# Patient Record
Sex: FEMALE | Race: WHITE | NOT HISPANIC OR LATINO | Employment: OTHER | URBAN - METROPOLITAN AREA
[De-identification: names, ages, dates, MRNs, and addresses within clinical notes are randomized per-mention and may not be internally consistent; named-entity substitution may affect disease eponyms.]

---

## 2019-07-25 ENCOUNTER — HOSPITAL ENCOUNTER (OUTPATIENT)
Dept: MEDSURG UNIT | Facility: HOSPITAL | Age: 59
End: 2019-07-26
Attending: COLON & RECTAL SURGERY | Admitting: COLON & RECTAL SURGERY

## 2019-07-25 LAB
ABS NEUT (OLG): 9.36 X10(3)/MCL (ref 2.1–9.2)
ALBUMIN SERPL-MCNC: 4.2 GM/DL (ref 3.4–5)
ALBUMIN/GLOB SERPL: 1 {RATIO}
ALP SERPL-CCNC: 77 UNIT/L (ref 38–126)
ALT SERPL-CCNC: 34 UNIT/L (ref 12–78)
APPEARANCE, UA: CLEAR
AST SERPL-CCNC: 27 UNIT/L (ref 15–37)
BACTERIA SPEC CULT: NORMAL /HPF
BASOPHILS # BLD AUTO: 0 X10(3)/MCL (ref 0–0.2)
BASOPHILS NFR BLD AUTO: 0 %
BILIRUB SERPL-MCNC: 0.6 MG/DL (ref 0.2–1)
BILIRUB UR QL STRIP: NEGATIVE
BILIRUBIN DIRECT+TOT PNL SERPL-MCNC: 0.2 MG/DL (ref 0–0.2)
BILIRUBIN DIRECT+TOT PNL SERPL-MCNC: 0.4 MG/DL (ref 0–0.8)
BUN SERPL-MCNC: 14 MG/DL (ref 7–18)
CALCIUM SERPL-MCNC: 9.4 MG/DL (ref 8.5–10.1)
CHLORIDE SERPL-SCNC: 107 MMOL/L (ref 98–107)
CO2 SERPL-SCNC: 22 MMOL/L (ref 21–32)
COLOR UR: YELLOW
CREAT SERPL-MCNC: 0.93 MG/DL (ref 0.55–1.02)
EOSINOPHIL # BLD AUTO: 0.1 X10(3)/MCL (ref 0–0.9)
EOSINOPHIL NFR BLD AUTO: 1 %
ERYTHROCYTE [DISTWIDTH] IN BLOOD BY AUTOMATED COUNT: 13.2 % (ref 11.5–17)
GLOBULIN SER-MCNC: 4 GM/DL (ref 2.4–3.5)
GLUCOSE (UA): NEGATIVE
GLUCOSE SERPL-MCNC: 84 MG/DL (ref 74–106)
HCT VFR BLD AUTO: 49 % (ref 37–47)
HGB BLD-MCNC: 15.5 GM/DL (ref 12–16)
HGB UR QL STRIP: NEGATIVE
IMM GRANULOCYTES # BLD AUTO: 0 10*3/UL
IMM GRANULOCYTES NFR BLD AUTO: 0 %
KETONES UR QL STRIP: NEGATIVE
LEUKOCYTE ESTERASE UR QL STRIP: NEGATIVE
LYMPHOCYTES # BLD AUTO: 1.3 X10(3)/MCL (ref 0.6–4.6)
LYMPHOCYTES NFR BLD AUTO: 11 %
MCH RBC QN AUTO: 29.2 PG (ref 27–31)
MCHC RBC AUTO-ENTMCNC: 31.6 GM/DL (ref 33–36)
MCV RBC AUTO: 92.3 FL (ref 80–94)
MONOCYTES # BLD AUTO: 0.8 X10(3)/MCL (ref 0.1–1.3)
MONOCYTES NFR BLD AUTO: 6 %
NEUTROPHILS # BLD AUTO: 9.36 X10(3)/MCL (ref 2.1–9.2)
NEUTROPHILS NFR BLD AUTO: 81 %
NITRITE UR QL STRIP: NEGATIVE
NRBC BLD AUTO-RTO: 0 % (ref 0–0.2)
PH UR STRIP: 5.5 [PH] (ref 5–9)
PLATELET # BLD AUTO: 177 X10(3)/MCL (ref 130–400)
PMV BLD AUTO: 13.5 FL (ref 9.4–12.4)
POTASSIUM SERPL-SCNC: 3.7 MMOL/L (ref 3.5–5.1)
PROT SERPL-MCNC: 8.2 GM/DL (ref 6.4–8.2)
PROT UR QL STRIP: NEGATIVE
RBC # BLD AUTO: 5.31 X10(6)/MCL (ref 4.2–5.4)
RBC #/AREA URNS HPF: NORMAL /[HPF]
SODIUM SERPL-SCNC: 137 MMOL/L (ref 136–145)
SP GR UR STRIP: 1.02 (ref 1–1.03)
SQUAMOUS EPITHELIAL, UA: NORMAL
UROBILINOGEN UR STRIP-ACNC: 0.2
WBC # SPEC AUTO: 11.6 X10(3)/MCL (ref 4.5–11.5)
WBC #/AREA URNS HPF: NORMAL /[HPF]

## 2021-03-25 PROBLEM — E04.1 RIGHT THYROID NODULE: Status: ACTIVE | Noted: 2019-10-23

## 2021-03-25 PROBLEM — Z00.00 WELLNESS EXAMINATION: Status: ACTIVE | Noted: 2021-03-25

## 2021-04-22 PROBLEM — Z12.11 SCREENING FOR MALIGNANT NEOPLASM OF COLON: Status: ACTIVE | Noted: 2021-04-22

## 2021-04-26 PROBLEM — R79.89 ABNORMAL LIVER FUNCTION TEST: Status: ACTIVE | Noted: 2021-04-26

## 2021-04-26 PROBLEM — G89.29 CHRONIC NECK PAIN: Chronic | Status: ACTIVE | Noted: 2021-04-26

## 2021-04-26 PROBLEM — M54.2 CHRONIC NECK PAIN: Chronic | Status: ACTIVE | Noted: 2021-04-26

## 2021-05-12 ENCOUNTER — PATIENT MESSAGE (OUTPATIENT)
Dept: RESEARCH | Facility: HOSPITAL | Age: 61
End: 2021-05-12

## 2021-06-28 PROBLEM — Z00.00 WELLNESS EXAMINATION: Status: RESOLVED | Noted: 2021-03-25 | Resolved: 2021-06-28

## 2021-08-03 PROBLEM — R79.89 ELEVATED LFTS: Status: ACTIVE | Noted: 2021-08-03

## 2022-05-01 NOTE — ED PROVIDER NOTES
Patient:   Dea Martin            MRN: [id number]            FIN: [id number]               Age:   58 years     Sex:  Female     :  1960   Associated Diagnoses:   Acute appendicitis   Author:   Filipe Osborne      Basic Information   Time seen: Date & time 2019 14:37:00.   History source: Patient.   Arrival mode: Private vehicle.   History limitation: None.   Additional information: Patient's physician(s): 1620: Assumed care [facility].      History of Present Illness   The patient presents with abdominal pain.  The onset was 1  days ago.  The course/duration of symptoms is constant.  The character of symptoms is sharp.  The degree at onset was moderate.  The Location of pain at onset was right, lower and abdominal.  The degree at present is moderate.  The Location of pain at present is right, lower and abdominal.  Radiating pain: none. There are exacerbating factors including changing position and movement but not urination.  The relieving factor is none.  Therapy today: none.  Associated symptoms: nausea, denies vomiting, denies fever and denies chills.  58-year-old  female presents chief complaint of right lower quadrant abdominal pain.  States last night pain with generalized abdomen but today has moved to right lower quadrant.  Denies fever chills reports positive nausea and loss of appetite.  Reports past medical history significant for renal stones.  Denies any urinary symptoms.  Triage [place].        Review of Systems   Constitutional symptoms:  No fever, no chills.    Respiratory symptoms:  No shortness of breath, no cough.    Cardiovascular symptoms:  No chest pain, no palpitations.    Gastrointestinal symptoms:  Abdominal pain, nausea, no vomiting, no diarrhea.    Musculoskeletal symptoms:  No back pain,    Neurologic symptoms:  No altered level of consciousness, no weakness.              Additional review of systems information: All other  systems reviewed and otherwise negative.      Health Status   Allergies:    No active allergies have been recorded.,    No qualifying data available  .   Medications: Per nurse's notes.   Immunizations: Per nurse's notes.   Menstrual history: Per nurse's notes.      Past Medical/ Family/ Social History   Medical history:    No active or resolved past medical history items have been selected or recorded., Reviewed as documented in chart.   Surgical history:    No active procedure history items have been selected or recorded., Reviewed as documented in chart.   Family history:    No family history items have been selected or recorded., Reviewed as documented in chart.   Social history:    Social & Psychosocial Habits    No Data Available  , Reviewed as documented in chart.   Problem list:    No qualifying data available  , per nurse's notes.      Physical Examination               Vital Signs      No qualifying data available.   General:  Alert, no acute distress, well hydrated, Skin: Normal for ethnicity.    Skin:  Warm, dry, pink, intact.    Head:  Normocephalic.   Neck:  Supple, no tenderness, full range of motion.    Eye:  Pupils are equal, round and reactive to light, extraocular movements are intact, normal conjunctiva.    Cardiovascular:  Regular rate and rhythm, No murmur, Normal peripheral perfusion.    Respiratory:  Lungs are clear to auscultation, breath sounds are equal, Respirations: not tachypneic, not labored, not shallow, Retractions: None.    Chest wall:  No tenderness.   Back:  Normal range of motion, Normal alignment, No costovertebral angle tenderness,    Musculoskeletal:  Normal ROM, normal strength, no swelling, no deformity.    Gastrointestinal:  Soft, Non distended, Tenderness: Moderate, right lower quadrant, Guarding: Negative, Rebound: Negative, Bowel sounds: Normal, Organomegaly: Negative, Trauma: Negative, Mass: Negative, Scars: Negative, Signs: None.    Neurological:  Alert and oriented to  person, place, time, and situation, No focal neurological deficit observed, normal sensory observed, normal motor observed, normal speech observed, normal coordination observed, Gait: Normal.    Psychiatric:  Cooperative, appropriate mood & affect, normal judgment.       Medical Decision Making   Documents reviewed:  Emergency department nurses' notes.   Orders  Launch Orders   Laboratory:  UA Total a reflex to culture (Order Processing): Stat collect, Urine, 7/25/2019 14:39 CDT, Nurse collect, Print Label By Order Location  CMP (Order Processing): Stat collect, 7/25/2019 14:39 CDT, Blood, Lab Collect, Print Label By Order Location, 7/25/2019 14:39 CDT  CBC w/ Auto Diff (Order Processing): Now collect, 7/25/2019 14:39 CDT, Blood, Lab Collect, Print Label By Order Location, 7/25/2019 14:39 CDT, Launch Orders   Radiology:  CT Abdomen and Pelvis W Contrast (Order): Stat, 7/25/2019 16:28 CDT, Abdominal Pain, None, Stretcher, Rad Type, Schedule this test, Launch Orders   Pharmacy:  Zosyn 3.375 gm (for IVPB) (Order): 3.375 gm, form: Infusion, IV Piggyback, Once, first dose 7/25/2019 17:35 CDT, stop date 7/25/2019 17:35 CDT, STAT  Zofran 2 mg/mL injectable solution (Order): 4 mg, form: Injection, IV, Once, first dose 7/25/2019 17:35 CDT, stop date 7/25/2019 17:35 CDT, STAT  Dilaudid (Order): 1 mg, form: Injection, IV, Once, first dose 7/25/2019 17:35 CDT, stop date 7/25/2019 17:35 CDT, STAT.    Results review:  Lab results : Lab View   7/25/2019 14:55 CDT      Sodium Lvl                137 mmol/L                             Potassium Lvl             3.7 mmol/L                             Chloride                  107 mmol/L                             CO2                       22.0 mmol/L                             Calcium Lvl               9.4 mg/dL                             Glucose Lvl               84 mg/dL                             BUN                       14.0 mg/dL                             Creatinine                 0.93 mg/dL                             eGFR-AA                   >60 mL/min/1.73 m2  NA                             eGFR-MU                  >60 mL/min/1.73 m2  NA                             Bili Total                0.6 mg/dL                             Bili Direct               0.20 mg/dL                             Bili Indirect             0.40 mg/dL                             AST                       27 unit/L                             ALT                       34 unit/L                             Alk Phos                  77 unit/L                             Total Protein             8.2 gm/dL                             Albumin Lvl               4.20 gm/dL                             Globulin                  4.00 gm/dL  HI                             A/G Ratio                 1.0  NA                             WBC                       11.6 x10(3)/mcL  HI                             RBC                       5.31 x10(6)/mcL                             Hgb                       15.5 gm/dL                             Hct                       49.0 %  HI                             Platelet                  177 x10(3)/mcL                             MCV                       92.3 fL                             MCH                       29.2 pg                             MCHC                      31.6 gm/dL  LOW                             RDW                       13.2 %                             MPV                       13.5 fL  HI                             Abs Neut                  9.36 x10(3)/mcL  HI                             Neutro Auto               81 %  NA                             Lymph Auto                11 %  NA                             Mono Auto                 6 %  NA                             Eos Auto                  1 %  NA                             Abs Eos                   0.1 x10(3)/mcL                             Basophil Auto             0 %  NA                              Abs Neutro                9.36 x10(3)/mcL  HI                             Abs Lymph                 1.3 x10(3)/mcL                             Abs Mono                  0.8 x10(3)/mcL                             Abs Baso                  0.0 x10(3)/mcL                             NRBC%                     0.0 %                             IG%                       0  NA                             IG#                       0  NA                             UA Appear                 CLEAR                             UA Color                  YELLOW                             UA Spec Grav              1.018                             UA Bili                   Negative                             UA pH                     5.5                             UA Urobilinogen           0.2                             UA Blood                  Negative                             UA Glucose                Negative                             UA Ketones                Negative                             UA Protein                Negative                             UA Nitrite                Negative                             UA Leuk Est               Negative                             UA WBC                    NONE SEEN                             UA RBC                    NONE SEEN                             UA Bacteria               NONE SEEN /HPF                             UA Squam Epithelial       NONE SEEN  ,    No qualifying data available.    Radiology results:  Rad Results (ST)  < 12 hrs   Accession: [id number]  Order: CT Abdomen and Pelvis W Contrast  Report Dt/Tm: 07/25/2019 17:36  Report:      History.  Right lower quadrant pain.     Reference.  No priors     Technique.  Helical acquisition through the abdomen and pelvis with IV contrast.  Three plane reconstructions were provided for review.  mGycm.  Automatic exposure control, adjustment of mA/kV or iterative  reconstruction  technique was used to reduce radiation.     Findings.  The limited imaged lung bases are clear.     There are innumerable small hepatic hypodensities which statistically  represent cysts. No significant abnormality of the gallbladder,  spleen, pancreas or adrenals. No hydronephrosis. Symmetric  nephrograms. There are some pelvic cysts primarily on the left.      No bowel obstruction. Appendix is dilated and fluid-filled with  periappendiceal inflammation. No gross perforation or abscess.     Urinary bladder unremarkable. No free fluid. Abdominal aorta normal in  caliber. No enlarged retroperitoneal lymph nodes.      No acute osseous findings.     Impression.  Acute appendicitis. No gross perforation or drainable abscess.     Critical Result: Appendicitis     Results were reported to Filipe Alexander at 1734 on 7/25/2019      .      Impression and Plan   Diagnosis   Acute appendicitis (AIF07-IE K35.80)   Plan   Condition: Stable.    Disposition: Admit time  7/25/2019 17:46:00, Admit to Surgery.    Orders: Launch Orders   Admit/Transfer/Discharge:  Admit as Inpatient (Order): 7/25/2019 17:46 CDT, Medical Unit Kyle KIM, Edwin MOLINA, Yes.    Notes: Admitted in collaboration with .

## 2022-05-01 NOTE — OP NOTE
DATE OF SURGERY:    07/25/2019    SURGEON:  Edwin Wright MD  ASSISTANT:  Kimo Leal MD, Lety Holley MD    PREOPERATIVE DIAGNOSIS:  Acute appendicitis.    POSTOPERATIVE DIAGNOSIS:  Acute appendicitis.    PROCEDURE:  Laparoscopic appendectomy.    ANESTHESIA:  General.    ESTIMATED BLOOD LOSS:  Less than 50 cc.    SPECIMENS:  Appendix.    COMPLICATIONS:  None.    PROCEDURE IN DETAIL:  After informed consent was obtained, patient was brought to the operating room and placed in the supine position.  Next, general endotracheal anesthesia was administered by a member of the anesthesia team.  The abdomen was prepped and draped in sterile surgical fashion.  Marcaine 0.25% with epinephrine was used for local anesthesia.  A curvilinear supraumbilical incision was made with a 15 blade.  Incision was deepened with electrocautery.  The fascia was divided vertically in the midline and the peritoneum was incised sharply.  A 12 mm balloon trocar was introduced, and pneumoperitoneum was achieved.  A 10 mm angled laparoscope was then used to place 2 additional 5 mm working trocars.  The patient was placed in Trendelenburg left side-down position.  Attention was turned to the right lower quadrant, where a thickened, inflamed appendix was noted.  The appendix was mobilized laterally by incising the peritoneum and then retracting it towards the anterior abdominal wall.  A small window was made at the base of the appendix along the mesenteric border.  The appendix was then divided at this level using a 45 mm Ironwood stapler with a blue cartridge.  The mesoappendix was then divided with a Harmonic scalpel.  The appendix was then placed within an Endo Catch retrieval bag, which was left in the abdominal cavity until the end of the case.  The right gutter and pelvis were thoroughly irrigated and suctioned until the effluent was clear.  The staple line was visualized and intact.  The patient was returned to the neutral  position.  Bowels were covered with omentum.  Pneumoperitoneum was released, and working trocars were removed.  Finally, the laparoscope and umbilical trocar were removed.  The Endo Catch retrieval bag containing the appendix was removed and sent for permanent pathology.  The umbilical fascial defect was repaired with a figure-of-eight 0 Vicryl suture.  All wounds were irrigated and the skin edges reapproximated with Monocryl suture in a subcuticular fashion.  The incisions were cleaned and sterile bandages applied.  The patient tolerated the procedure well.  There were no complications.  She was awakened and extubated in the operating room, then subsequently transferred to recovery in satisfactory condition.        ______________________________  MD ARNIE Reed/JANNETTE  DD:  07/25/2019  Time:  09:22PM  DT:  07/25/2019  Time:  09:35PM  Job #:  [id number]

## 2022-10-13 ENCOUNTER — OFFICE VISIT (OUTPATIENT)
Dept: HEPATOLOGY | Facility: CLINIC | Age: 62
End: 2022-10-13
Payer: COMMERCIAL

## 2022-10-13 ENCOUNTER — LAB VISIT (OUTPATIENT)
Dept: LAB | Facility: HOSPITAL | Age: 62
End: 2022-10-13
Attending: INTERNAL MEDICINE
Payer: COMMERCIAL

## 2022-10-13 VITALS
BODY MASS INDEX: 25.29 KG/M2 | WEIGHT: 152 LBS | HEART RATE: 69 BPM | OXYGEN SATURATION: 99 % | DIASTOLIC BLOOD PRESSURE: 70 MMHG | TEMPERATURE: 98 F | SYSTOLIC BLOOD PRESSURE: 118 MMHG

## 2022-10-13 DIAGNOSIS — K75.4 AUTOIMMUNE HEPATITIS: ICD-10-CM

## 2022-10-13 DIAGNOSIS — K75.4 AUTOIMMUNE HEPATITIS: Primary | ICD-10-CM

## 2022-10-13 LAB
AFP SERPL-MCNC: 5.9 NG/ML (ref 0–8.4)
ALBUMIN SERPL BCP-MCNC: 3.8 G/DL (ref 3.5–5.2)
ALP SERPL-CCNC: 96 U/L (ref 55–135)
ALT SERPL W/O P-5'-P-CCNC: 16 U/L (ref 10–44)
ANION GAP SERPL CALC-SCNC: 7 MMOL/L (ref 8–16)
AST SERPL-CCNC: 27 U/L (ref 10–40)
BILIRUB SERPL-MCNC: 0.5 MG/DL (ref 0.1–1)
BUN SERPL-MCNC: 15 MG/DL (ref 8–23)
CALCIUM SERPL-MCNC: 9.7 MG/DL (ref 8.7–10.5)
CHLORIDE SERPL-SCNC: 107 MMOL/L (ref 95–110)
CO2 SERPL-SCNC: 27 MMOL/L (ref 23–29)
CREAT SERPL-MCNC: 0.8 MG/DL (ref 0.5–1.4)
ERYTHROCYTE [DISTWIDTH] IN BLOOD BY AUTOMATED COUNT: 12.3 % (ref 11.5–14.5)
EST. GFR  (NO RACE VARIABLE): >60 ML/MIN/1.73 M^2
GLUCOSE SERPL-MCNC: 90 MG/DL (ref 70–110)
HCT VFR BLD AUTO: 43.8 % (ref 37–48.5)
HGB BLD-MCNC: 14.8 G/DL (ref 12–16)
IGG SERPL-MCNC: 1409 MG/DL (ref 650–1600)
MCH RBC QN AUTO: 31.6 PG (ref 27–31)
MCHC RBC AUTO-ENTMCNC: 33.8 G/DL (ref 32–36)
MCV RBC AUTO: 93 FL (ref 82–98)
PLATELET # BLD AUTO: 173 K/UL (ref 150–450)
PMV BLD AUTO: 11.7 FL (ref 9.2–12.9)
POTASSIUM SERPL-SCNC: 4.2 MMOL/L (ref 3.5–5.1)
PROT SERPL-MCNC: 7.3 G/DL (ref 6–8.4)
RBC # BLD AUTO: 4.69 M/UL (ref 4–5.4)
SODIUM SERPL-SCNC: 141 MMOL/L (ref 136–145)
WBC # BLD AUTO: 4.47 K/UL (ref 3.9–12.7)

## 2022-10-13 PROCEDURE — 1159F MED LIST DOCD IN RCRD: CPT | Mod: CPTII,S$GLB,, | Performed by: INTERNAL MEDICINE

## 2022-10-13 PROCEDURE — 99999 PR PBB SHADOW E&M-EST. PATIENT-LVL III: CPT | Mod: PBBFAC,,, | Performed by: INTERNAL MEDICINE

## 2022-10-13 PROCEDURE — 99205 PR OFFICE/OUTPT VISIT, NEW, LEVL V, 60-74 MIN: ICD-10-PCS | Mod: S$GLB,,, | Performed by: INTERNAL MEDICINE

## 2022-10-13 PROCEDURE — 85027 COMPLETE CBC AUTOMATED: CPT | Performed by: INTERNAL MEDICINE

## 2022-10-13 PROCEDURE — 86256 FLUORESCENT ANTIBODY TITER: CPT | Performed by: INTERNAL MEDICINE

## 2022-10-13 PROCEDURE — 99999 PR PBB SHADOW E&M-EST. PATIENT-LVL III: ICD-10-PCS | Mod: PBBFAC,,, | Performed by: INTERNAL MEDICINE

## 2022-10-13 PROCEDURE — 3078F PR MOST RECENT DIASTOLIC BLOOD PRESSURE < 80 MM HG: ICD-10-PCS | Mod: CPTII,S$GLB,, | Performed by: INTERNAL MEDICINE

## 2022-10-13 PROCEDURE — 36415 COLL VENOUS BLD VENIPUNCTURE: CPT | Performed by: INTERNAL MEDICINE

## 2022-10-13 PROCEDURE — 3074F PR MOST RECENT SYSTOLIC BLOOD PRESSURE < 130 MM HG: ICD-10-PCS | Mod: CPTII,S$GLB,, | Performed by: INTERNAL MEDICINE

## 2022-10-13 PROCEDURE — 86039 ANTINUCLEAR ANTIBODIES (ANA): CPT | Performed by: INTERNAL MEDICINE

## 2022-10-13 PROCEDURE — 3074F SYST BP LT 130 MM HG: CPT | Mod: CPTII,S$GLB,, | Performed by: INTERNAL MEDICINE

## 2022-10-13 PROCEDURE — 80053 COMPREHEN METABOLIC PANEL: CPT | Performed by: INTERNAL MEDICINE

## 2022-10-13 PROCEDURE — 3078F DIAST BP <80 MM HG: CPT | Mod: CPTII,S$GLB,, | Performed by: INTERNAL MEDICINE

## 2022-10-13 PROCEDURE — 99205 OFFICE O/P NEW HI 60 MIN: CPT | Mod: S$GLB,,, | Performed by: INTERNAL MEDICINE

## 2022-10-13 PROCEDURE — 82105 ALPHA-FETOPROTEIN SERUM: CPT | Performed by: INTERNAL MEDICINE

## 2022-10-13 PROCEDURE — 86038 ANTINUCLEAR ANTIBODIES: CPT | Performed by: INTERNAL MEDICINE

## 2022-10-13 PROCEDURE — 86235 NUCLEAR ANTIGEN ANTIBODY: CPT | Mod: 59 | Performed by: INTERNAL MEDICINE

## 2022-10-13 PROCEDURE — 1159F PR MEDICATION LIST DOCUMENTED IN MEDICAL RECORD: ICD-10-PCS | Mod: CPTII,S$GLB,, | Performed by: INTERNAL MEDICINE

## 2022-10-13 PROCEDURE — 82784 ASSAY IGA/IGD/IGG/IGM EACH: CPT | Performed by: INTERNAL MEDICINE

## 2022-10-13 RX ORDER — CEPHALEXIN 500 MG/1
500 CAPSULE ORAL DAILY PRN
COMMUNITY
Start: 2022-09-14 | End: 2023-02-06 | Stop reason: SDUPTHER

## 2022-10-13 NOTE — PROGRESS NOTES
Subjective:       Patient ID: Dea Martin is a 61 y.o. female.    Chief Complaint: Elevated Hepatic Enzymes    HPI  I saw this 61 y.o. lady who came to the liver clinic alone.  She was diagnosed with Autoimmune hepatitis in the summer of 2021 after LFT elevation noted in March 2021.    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  Differential includes nonalcoholic   steatohepatitis, drug toxicity, and hypersensitivity reaction.  Correlate   clinically. Negative for hepatocellular dysplasia.                Note:  There is moderate chronic portal inflammation containing eosinophils   present along with focal interface activity.  There is patchy mild lobular   inflammation present.  Approximately 40% of lobular hepatocytes exhibit mixed   micro and macrovesicular steatosis.  There is also patchy ballooning   degeneration of hepatocytes present.  No Sweetie bodies are present.  No   hepatocellular dysplasia is present.  Trichrome stain reveals several portal   tracts with expansion by collagenous tissue with focal septi formation.  No   bridging fibrosis is present.  Reticulin stain, DPAS, PAS, and iron stains   are noncontributory or reveal normal expected findings.  Controls are   adequate.  Differential diagnosis includes nonalcoholic steatohepatitis,   alcohol hepatitis, drug toxicity, and hypersensitivity reaction.  Clinical   correlation is recommended..  Using the NAFLD activity score, this biopsy   would received 1+ for lobular inflammation, 2 + for steatosis, and 1+ for   ballooning degeneration for total score of 4/8.  The staging score would be1c   for mild portal fibrosis without pericellular fibrosis      - LFTs started ocming down with immunosuppression  - Peak AST/ALT= 397/631    - NSAIDs  - Some RUQ discomfort- intermittent    - no supplements in 2021  - on premarin cream Feb  2021    Currently on UDCA  AZA- 50 mg daily  Liver and kidney cysts on imaging    Body mass index is 25.29 kg/m².      PMH:  Appendectomy 2019  Bladder suspension  Kidney stone + kidney cysts  Thyroid nodule    SH:  Alcohol- nil for 25 years  Non smoker    RN - retired 2013      FH:  Nil autoimmune  DM  Heart disease      Review of Systems   Constitutional:  Negative for activity change, appetite change, chills, fatigue, fever and unexpected weight change.   HENT:  Negative for ear pain, hearing loss, nosebleeds, sore throat and trouble swallowing.    Eyes:  Negative for redness and visual disturbance.   Respiratory:  Negative for cough, chest tightness, shortness of breath and wheezing.    Cardiovascular:  Negative for chest pain and palpitations.   Gastrointestinal:  Negative for abdominal distention, abdominal pain, blood in stool, constipation, diarrhea, nausea and vomiting.   Genitourinary:  Negative for difficulty urinating, dysuria, frequency, hematuria and urgency.   Musculoskeletal:  Negative for arthralgias, back pain, gait problem, joint swelling and myalgias.   Skin:  Negative for rash.   Neurological:  Negative for tremors, seizures, speech difficulty, weakness and headaches.   Hematological:  Negative for adenopathy.   Psychiatric/Behavioral:  Negative for confusion, decreased concentration and sleep disturbance. The patient is not nervous/anxious.        Lab Results   Component Value Date    ALT 16 10/13/2022    AST 27 10/13/2022    ALKPHOS 96 10/13/2022    BILITOT 0.5 10/13/2022     Past Medical History:   Diagnosis Date    Thyroid nodule      Past Surgical History:   Procedure Laterality Date    APPENDECTOMY      BLADDER SUSPENSION      COLONOSCOPY N/A 4/22/2021    Procedure: COLONOSCOPY;  Surgeon: Bo Upton MD;  Location: [facility];  Service: Endoscopy;  Laterality: N/A;     Current Outpatient Medications   Medication Sig    azaTHIOprine (IMURAN) 50 mg Tab Take 1 tablet (50 mg total) by mouth  once daily.    ursodioL (ACTIGALL) 500 MG tablet Take 1 tablet (500 mg total) by mouth 3 (three) times daily.    azaTHIOprine (IMURAN) 50 mg Tab Take 1 tablet (50 mg total) by mouth once daily.    cephALEXin (KEFLEX) 500 MG capsule Take 500 mg by mouth daily as needed.    conjugated estrogens (PREMARIN) vaginal cream Place 0.5 g vaginally once daily.    folic acid/multivit-min/lutein (CENTRUM SILVER ORAL) Take 1 tablet by mouth once daily.    nitrofurantoin monohyd/m-cryst (MACROBID ORAL) Take by mouth.    prochlorperazine (COMPAZINE) 5 MG tablet Take 1 tablet (5 mg total) by mouth 3 (three) times daily as needed for Nausea (and headache).     No current facility-administered medications for this visit.       Objective:      Physical Exam  Constitutional:       General: She is not in acute distress.  HENT:      Head: Normocephalic.   Eyes:      Pupils: Pupils are equal, round, and reactive to light.   Neck:      Thyroid: No thyromegaly.      Vascular: No JVD.      Trachea: No tracheal deviation.   Cardiovascular:      Rate and Rhythm: Normal rate and regular rhythm.      Heart sounds: Normal heart sounds. No murmur heard.  Pulmonary:      Effort: Pulmonary effort is normal.      Breath sounds: Normal breath sounds. No stridor.   Abdominal:      Palpations: Abdomen is soft.   Lymphadenopathy:      Head:      Right side of head: No submental, submandibular, tonsillar, preauricular, posterior auricular or occipital adenopathy.      Left side of head: No submental, submandibular, tonsillar, preauricular, posterior auricular or occipital adenopathy.      Cervical: No cervical adenopathy.   Neurological:      Mental Status: She is alert. She is not disoriented.      Cranial Nerves: No cranial nerve deficit.      Sensory: No sensory deficit.         Assessment:       1. Autoimmune hepatitis        Plan:   It appears that she has responded well to AZA- never had corticosteroids.    1) Labs and AFP today  2) Liver US  3) Can  stop UDCA  4) Monthly labs    Video visit in 3 months

## 2022-10-14 LAB
ANA PATTERN 1: NORMAL
ANA SER QL IF: POSITIVE
ANA TITR SER IF: NORMAL {TITER}

## 2022-10-16 PROBLEM — K75.4 AUTOIMMUNE HEPATITIS: Status: ACTIVE | Noted: 2022-10-16

## 2022-10-17 LAB — SMOOTH MUSCLE AB TITR SER IF: NORMAL {TITER}

## 2022-10-18 LAB
ANTI SM ANTIBODY: 0.09 RATIO (ref 0–0.99)
ANTI SM/RNP ANTIBODY: 0.12 RATIO (ref 0–0.99)
ANTI-SM INTERPRETATION: NEGATIVE
ANTI-SM/RNP INTERPRETATION: NEGATIVE
ANTI-SSA ANTIBODY: 0.07 RATIO (ref 0–0.99)
ANTI-SSA INTERPRETATION: NEGATIVE
ANTI-SSB ANTIBODY: 0.05 RATIO (ref 0–0.99)
ANTI-SSB INTERPRETATION: NEGATIVE
DSDNA AB SER-ACNC: NORMAL [IU]/ML

## 2023-01-25 ENCOUNTER — OFFICE VISIT (OUTPATIENT)
Dept: HEPATOLOGY | Facility: CLINIC | Age: 63
End: 2023-01-25
Payer: COMMERCIAL

## 2023-01-25 DIAGNOSIS — K75.4 AUTOIMMUNE HEPATITIS: Primary | ICD-10-CM

## 2023-01-25 PROCEDURE — 99213 PR OFFICE/OUTPT VISIT, EST, LEVL III, 20-29 MIN: ICD-10-PCS | Mod: 95,,, | Performed by: INTERNAL MEDICINE

## 2023-01-25 PROCEDURE — 99213 OFFICE O/P EST LOW 20 MIN: CPT | Mod: 95,,, | Performed by: INTERNAL MEDICINE

## 2023-01-25 RX ORDER — AZATHIOPRINE 50 MG/1
50 TABLET ORAL DAILY
Qty: 30 TABLET | Refills: 11 | Status: SHIPPED | OUTPATIENT
Start: 2023-01-25 | End: 2024-01-25

## 2023-01-25 NOTE — PROGRESS NOTES
Subjective:       Patient ID: Dea Martin is a 62 y.o. female.    Chief Complaint: No chief complaint on file.  The patient location is: Home  The chief complaint leading to consultation is: Autoimmune hepatitis    Visit type: audiovisual    Face to Face time with patient: 20 minutes of total time spent on the encounter, which includes face to face time and non-face to face time preparing to see the patient (eg, review of tests), Obtaining and/or reviewing separately obtained history, Documenting clinical information in the electronic or other health record, Independently interpreting results (not separately reported) and communicating results to the patient/family/caregiver, or Care coordination (not separately reported).       Each patient to whom he or she provides medical services by telemedicine is:  (1) informed of the relationship between the physician and patient and the respective role of any other health care provider with respect to management of the patient; and (2) notified that he or she may decline to receive medical services by telemedicine and may withdraw from such care at any time.    Notes:       HPI  I saw this 62 y.o. lady for follow up.  She was diagnosed with Autoimmune hepatitis in the summer of 2021 after LFT elevation noted in March 2021.    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  Differential includes nonalcoholic   steatohepatitis, drug toxicity, and hypersensitivity reaction.  Correlate   clinically. Negative for hepatocellular dysplasia.                Note:  There is moderate chronic portal inflammation containing eosinophils   present along with focal interface activity.  There is patchy mild lobular   inflammation present.  Approximately 40% of lobular hepatocytes exhibit mixed   micro and macrovesicular steatosis.  There is also patchy ballooning    degeneration of hepatocytes present.  No Sweetie bodies are present.  No   hepatocellular dysplasia is present.  Trichrome stain reveals several portal   tracts with expansion by collagenous tissue with focal septi formation.  No   bridging fibrosis is present.  Reticulin stain, DPAS, PAS, and iron stains   are noncontributory or reveal normal expected findings.  Controls are   adequate.  Differential diagnosis includes nonalcoholic steatohepatitis,   alcohol hepatitis, drug toxicity, and hypersensitivity reaction.  Clinical   correlation is recommended..  Using the NAFLD activity score, this biopsy   would received 1+ for lobular inflammation, 2 + for steatosis, and 1+ for   ballooning degeneration for total score of 4/8.  The staging score would be1c   for mild portal fibrosis without pericellular fibrosis      - LFTs started ocming down with immunosuppression  - Peak AST/ALT= 397/631    - NSAIDs  - Some RUQ discomfort- intermittent    - no supplements in 2021  - on premarin cream Feb 2021    AZA- 50 mg daily- stopped UDCA  Liver and kidney cysts on imaging      PMH:  Appendectomy 2019  Bladder suspension  Kidney stone + kidney cysts  Thyroid nodule    SH:  Alcohol- nil for 25 years  Non smoker    RN - retired 2013      FH:  Nil autoimmune  DM  Heart disease      Review of Systems   Constitutional:  Negative for activity change, appetite change, chills, fatigue, fever and unexpected weight change.   HENT:  Negative for ear pain, hearing loss, nosebleeds, sore throat and trouble swallowing.    Eyes:  Negative for redness and visual disturbance.   Respiratory:  Negative for cough, chest tightness, shortness of breath and wheezing.    Cardiovascular:  Negative for chest pain and palpitations.   Gastrointestinal:  Negative for abdominal distention, abdominal pain, blood in stool, constipation, diarrhea, nausea and vomiting.   Genitourinary:  Negative for difficulty urinating, dysuria, frequency, hematuria and  urgency.   Musculoskeletal:  Negative for arthralgias, back pain, gait problem, joint swelling and myalgias.   Skin:  Negative for rash.   Neurological:  Negative for tremors, seizures, speech difficulty, weakness and headaches.   Hematological:  Negative for adenopathy.   Psychiatric/Behavioral:  Negative for confusion, decreased concentration and sleep disturbance. The patient is not nervous/anxious.        Lab Results   Component Value Date    ALT 21 01/13/2023    AST 31 01/13/2023    ALKPHOS 72 01/13/2023    BILITOT 0.6 01/13/2023     Past Medical History:   Diagnosis Date    Thyroid nodule      Past Surgical History:   Procedure Laterality Date    APPENDECTOMY      BLADDER SUSPENSION      COLONOSCOPY N/A 4/22/2021    Procedure: COLONOSCOPY;  Surgeon: Bo Upton MD;  Location: [facility];  Service: Endoscopy;  Laterality: N/A;     Current Outpatient Medications   Medication Sig    azaTHIOprine (IMURAN) 50 mg Tab Take 1 tablet (50 mg total) by mouth once daily.    cephALEXin (KEFLEX) 500 MG capsule Take 500 mg by mouth daily as needed.     No current facility-administered medications for this visit.       Objective:      Physical Exam  Constitutional:       General: She is not in acute distress.  HENT:      Head: Normocephalic.   Eyes:      Pupils: Pupils are equal, round, and reactive to light.   Neck:      Thyroid: No thyromegaly.      Vascular: No JVD.      Trachea: No tracheal deviation.   Cardiovascular:      Rate and Rhythm: Normal rate and regular rhythm.      Heart sounds: Normal heart sounds. No murmur heard.  Pulmonary:      Effort: Pulmonary effort is normal.      Breath sounds: Normal breath sounds. No stridor.   Abdominal:      Palpations: Abdomen is soft.   Lymphadenopathy:      Head:      Right side of head: No submental, submandibular, tonsillar, preauricular, posterior auricular or occipital adenopathy.      Left side of head: No submental, submandibular, tonsillar, preauricular, posterior  auricular or occipital adenopathy.      Cervical: No cervical adenopathy.   Neurological:      Mental Status: She is alert. She is not disoriented.      Cranial Nerves: No cranial nerve deficit.      Sensory: No sensory deficit.         Assessment:       1. Autoimmune hepatitis      Plan:   It appears that she has responded well to AZA- never had corticosteroids.    - remains well and has normal LFTs on AZA 50 mg daily    - labs every 2 months  - liver US in [place] in June 2023  - clinic in June 2023- video visit    - nothing to be scheduled on Thursday

## 2023-01-25 NOTE — Clinical Note
1. Please schedule liver US in June 2023 in [place] 2. Please schedule labs every 2 months starting in March 2023 in [place]  Don't schedule anything on a Thursday [place] in June 2023

## 2023-02-06 PROBLEM — N30.20 CHRONIC CYSTITIS: Status: ACTIVE | Noted: 2023-02-06

## 2023-05-04 ENCOUNTER — TELEPHONE (OUTPATIENT)
Dept: HEPATOLOGY | Facility: CLINIC | Age: 63
End: 2023-05-04
Payer: COMMERCIAL

## 2023-05-04 NOTE — TELEPHONE ENCOUNTER
----- Message from Lia Huitron RN sent at 5/4/2023  5:58 PM CDT -----  Regarding: FW: Appointment    ----- Message -----  From: Tiffany Cheema  Sent: 5/4/2023  10:45 AM CDT  To: [facility] Hepatology Scheduling  Subject: Appointment                                      Pt needs to reschedule their appt on 06/21. They were due to return in 5 months.      Dea @ [phone]

## 2023-05-10 ENCOUNTER — TELEPHONE (OUTPATIENT)
Dept: HEPATOLOGY | Facility: CLINIC | Age: 63
End: 2023-05-10
Payer: COMMERCIAL

## 2023-08-23 ENCOUNTER — LAB VISIT (OUTPATIENT)
Dept: LAB | Facility: HOSPITAL | Age: 63
End: 2023-08-23
Attending: INTERNAL MEDICINE
Payer: COMMERCIAL

## 2023-08-23 ENCOUNTER — OFFICE VISIT (OUTPATIENT)
Dept: HEPATOLOGY | Facility: CLINIC | Age: 63
End: 2023-08-23
Payer: COMMERCIAL

## 2023-08-23 VITALS
BODY MASS INDEX: 26.55 KG/M2 | TEMPERATURE: 98 F | WEIGHT: 159.38 LBS | OXYGEN SATURATION: 96 % | SYSTOLIC BLOOD PRESSURE: 146 MMHG | DIASTOLIC BLOOD PRESSURE: 85 MMHG | HEART RATE: 82 BPM | HEIGHT: 65 IN

## 2023-08-23 DIAGNOSIS — K75.4 AUTOIMMUNE HEPATITIS: Primary | ICD-10-CM

## 2023-08-23 DIAGNOSIS — K75.4 AUTOIMMUNE HEPATITIS: ICD-10-CM

## 2023-08-23 LAB
AFP SERPL-MCNC: 3.9 NG/ML (ref 0–8.4)
ALBUMIN SERPL BCP-MCNC: 4.1 G/DL (ref 3.5–5.2)
ALBUMIN SERPL BCP-MCNC: 4.1 G/DL (ref 3.5–5.2)
ALP SERPL-CCNC: 66 U/L (ref 55–135)
ALP SERPL-CCNC: 66 U/L (ref 55–135)
ALT SERPL W/O P-5'-P-CCNC: 21 U/L (ref 10–44)
ALT SERPL W/O P-5'-P-CCNC: 21 U/L (ref 10–44)
ANION GAP SERPL CALC-SCNC: 6 MMOL/L (ref 8–16)
ANION GAP SERPL CALC-SCNC: 6 MMOL/L (ref 8–16)
AST SERPL-CCNC: 30 U/L (ref 10–40)
AST SERPL-CCNC: 30 U/L (ref 10–40)
BASOPHILS # BLD AUTO: 0.04 K/UL (ref 0–0.2)
BASOPHILS # BLD AUTO: 0.04 K/UL (ref 0–0.2)
BASOPHILS NFR BLD: 0.7 % (ref 0–1.9)
BASOPHILS NFR BLD: 0.7 % (ref 0–1.9)
BILIRUB SERPL-MCNC: 0.7 MG/DL (ref 0.1–1)
BILIRUB SERPL-MCNC: 0.7 MG/DL (ref 0.1–1)
BUN SERPL-MCNC: 15 MG/DL (ref 8–23)
BUN SERPL-MCNC: 15 MG/DL (ref 8–23)
CALCIUM SERPL-MCNC: 9.5 MG/DL (ref 8.7–10.5)
CALCIUM SERPL-MCNC: 9.5 MG/DL (ref 8.7–10.5)
CHLORIDE SERPL-SCNC: 105 MMOL/L (ref 95–110)
CHLORIDE SERPL-SCNC: 105 MMOL/L (ref 95–110)
CO2 SERPL-SCNC: 26 MMOL/L (ref 23–29)
CO2 SERPL-SCNC: 26 MMOL/L (ref 23–29)
CREAT SERPL-MCNC: 0.8 MG/DL (ref 0.5–1.4)
CREAT SERPL-MCNC: 0.8 MG/DL (ref 0.5–1.4)
DIFFERENTIAL METHOD: ABNORMAL
DIFFERENTIAL METHOD: ABNORMAL
EOSINOPHIL # BLD AUTO: 0.1 K/UL (ref 0–0.5)
EOSINOPHIL # BLD AUTO: 0.1 K/UL (ref 0–0.5)
EOSINOPHIL NFR BLD: 1.8 % (ref 0–8)
EOSINOPHIL NFR BLD: 1.8 % (ref 0–8)
ERYTHROCYTE [DISTWIDTH] IN BLOOD BY AUTOMATED COUNT: 12.7 % (ref 11.5–14.5)
ERYTHROCYTE [DISTWIDTH] IN BLOOD BY AUTOMATED COUNT: 12.7 % (ref 11.5–14.5)
EST. GFR  (NO RACE VARIABLE): >60 ML/MIN/1.73 M^2
EST. GFR  (NO RACE VARIABLE): >60 ML/MIN/1.73 M^2
GLUCOSE SERPL-MCNC: 74 MG/DL (ref 70–110)
GLUCOSE SERPL-MCNC: 74 MG/DL (ref 70–110)
HCT VFR BLD AUTO: 43.8 % (ref 37–48.5)
HCT VFR BLD AUTO: 43.8 % (ref 37–48.5)
HGB BLD-MCNC: 14.4 G/DL (ref 12–16)
HGB BLD-MCNC: 14.4 G/DL (ref 12–16)
IGG SERPL-MCNC: 1244 MG/DL (ref 650–1600)
IMM GRANULOCYTES # BLD AUTO: 0.01 K/UL (ref 0–0.04)
IMM GRANULOCYTES # BLD AUTO: 0.01 K/UL (ref 0–0.04)
IMM GRANULOCYTES NFR BLD AUTO: 0.2 % (ref 0–0.5)
IMM GRANULOCYTES NFR BLD AUTO: 0.2 % (ref 0–0.5)
LYMPHOCYTES # BLD AUTO: 1.4 K/UL (ref 1–4.8)
LYMPHOCYTES # BLD AUTO: 1.4 K/UL (ref 1–4.8)
LYMPHOCYTES NFR BLD: 25.6 % (ref 18–48)
LYMPHOCYTES NFR BLD: 25.6 % (ref 18–48)
MCH RBC QN AUTO: 30.6 PG (ref 27–31)
MCH RBC QN AUTO: 30.6 PG (ref 27–31)
MCHC RBC AUTO-ENTMCNC: 32.9 G/DL (ref 32–36)
MCHC RBC AUTO-ENTMCNC: 32.9 G/DL (ref 32–36)
MCV RBC AUTO: 93 FL (ref 82–98)
MCV RBC AUTO: 93 FL (ref 82–98)
MONOCYTES # BLD AUTO: 0.5 K/UL (ref 0.3–1)
MONOCYTES # BLD AUTO: 0.5 K/UL (ref 0.3–1)
MONOCYTES NFR BLD: 9 % (ref 4–15)
MONOCYTES NFR BLD: 9 % (ref 4–15)
NEUTROPHILS # BLD AUTO: 3.4 K/UL (ref 1.8–7.7)
NEUTROPHILS # BLD AUTO: 3.4 K/UL (ref 1.8–7.7)
NEUTROPHILS NFR BLD: 62.7 % (ref 38–73)
NEUTROPHILS NFR BLD: 62.7 % (ref 38–73)
NRBC BLD-RTO: 0 /100 WBC
NRBC BLD-RTO: 0 /100 WBC
PLATELET # BLD AUTO: 167 K/UL (ref 150–450)
PLATELET # BLD AUTO: 167 K/UL (ref 150–450)
PMV BLD AUTO: 13.2 FL (ref 9.2–12.9)
PMV BLD AUTO: 13.2 FL (ref 9.2–12.9)
POTASSIUM SERPL-SCNC: 3.9 MMOL/L (ref 3.5–5.1)
POTASSIUM SERPL-SCNC: 3.9 MMOL/L (ref 3.5–5.1)
PROT SERPL-MCNC: 7.4 G/DL (ref 6–8.4)
PROT SERPL-MCNC: 7.4 G/DL (ref 6–8.4)
RBC # BLD AUTO: 4.7 M/UL (ref 4–5.4)
RBC # BLD AUTO: 4.7 M/UL (ref 4–5.4)
SODIUM SERPL-SCNC: 137 MMOL/L (ref 136–145)
SODIUM SERPL-SCNC: 137 MMOL/L (ref 136–145)
WBC # BLD AUTO: 5.42 K/UL (ref 3.9–12.7)
WBC # BLD AUTO: 5.42 K/UL (ref 3.9–12.7)

## 2023-08-23 PROCEDURE — 36415 COLL VENOUS BLD VENIPUNCTURE: CPT | Performed by: INTERNAL MEDICINE

## 2023-08-23 PROCEDURE — 1159F PR MEDICATION LIST DOCUMENTED IN MEDICAL RECORD: ICD-10-PCS | Mod: CPTII,S$GLB,, | Performed by: INTERNAL MEDICINE

## 2023-08-23 PROCEDURE — 99214 PR OFFICE/OUTPT VISIT, EST, LEVL IV, 30-39 MIN: ICD-10-PCS | Mod: S$GLB,,, | Performed by: INTERNAL MEDICINE

## 2023-08-23 PROCEDURE — 80053 COMPREHEN METABOLIC PANEL: CPT | Performed by: INTERNAL MEDICINE

## 2023-08-23 PROCEDURE — 82105 ALPHA-FETOPROTEIN SERUM: CPT | Performed by: INTERNAL MEDICINE

## 2023-08-23 PROCEDURE — 3008F BODY MASS INDEX DOCD: CPT | Mod: CPTII,S$GLB,, | Performed by: INTERNAL MEDICINE

## 2023-08-23 PROCEDURE — 99214 OFFICE O/P EST MOD 30 MIN: CPT | Mod: S$GLB,,, | Performed by: INTERNAL MEDICINE

## 2023-08-23 PROCEDURE — 85025 COMPLETE CBC W/AUTO DIFF WBC: CPT | Performed by: INTERNAL MEDICINE

## 2023-08-23 PROCEDURE — 82784 ASSAY IGA/IGD/IGG/IGM EACH: CPT | Performed by: INTERNAL MEDICINE

## 2023-08-23 PROCEDURE — 3077F PR MOST RECENT SYSTOLIC BLOOD PRESSURE >= 140 MM HG: ICD-10-PCS | Mod: CPTII,S$GLB,, | Performed by: INTERNAL MEDICINE

## 2023-08-23 PROCEDURE — 99999 PR PBB SHADOW E&M-EST. PATIENT-LVL III: ICD-10-PCS | Mod: PBBFAC,,, | Performed by: INTERNAL MEDICINE

## 2023-08-23 PROCEDURE — 3079F PR MOST RECENT DIASTOLIC BLOOD PRESSURE 80-89 MM HG: ICD-10-PCS | Mod: CPTII,S$GLB,, | Performed by: INTERNAL MEDICINE

## 2023-08-23 PROCEDURE — 1159F MED LIST DOCD IN RCRD: CPT | Mod: CPTII,S$GLB,, | Performed by: INTERNAL MEDICINE

## 2023-08-23 PROCEDURE — 3077F SYST BP >= 140 MM HG: CPT | Mod: CPTII,S$GLB,, | Performed by: INTERNAL MEDICINE

## 2023-08-23 PROCEDURE — 86015 ACTIN ANTIBODY EACH: CPT | Performed by: INTERNAL MEDICINE

## 2023-08-23 PROCEDURE — 99999 PR PBB SHADOW E&M-EST. PATIENT-LVL III: CPT | Mod: PBBFAC,,, | Performed by: INTERNAL MEDICINE

## 2023-08-23 PROCEDURE — 3079F DIAST BP 80-89 MM HG: CPT | Mod: CPTII,S$GLB,, | Performed by: INTERNAL MEDICINE

## 2023-08-23 PROCEDURE — 3008F PR BODY MASS INDEX (BMI) DOCUMENTED: ICD-10-PCS | Mod: CPTII,S$GLB,, | Performed by: INTERNAL MEDICINE

## 2023-08-23 NOTE — PROGRESS NOTES
Subjective:       Patient ID: Dea Martin is a 62 y.o. female.    Chief Complaint: No chief complaint on file.    HPI  I saw this 62 y.o. lady for follow up. I first met her by video visit in Jan 2023.  She was diagnosed with Autoimmune hepatitis in the summer of 2021 after LFT elevation noted in March 2021.    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  Differential includes nonalcoholic   steatohepatitis, drug toxicity, and hypersensitivity reaction.  Correlate   clinically. Negative for hepatocellular dysplasia.                Note:  There is moderate chronic portal inflammation containing eosinophils   present along with focal interface activity.  There is patchy mild lobular   inflammation present.  Approximately 40% of lobular hepatocytes exhibit mixed   micro and macrovesicular steatosis.  There is also patchy ballooning   degeneration of hepatocytes present.  No Sweetie bodies are present.  No   hepatocellular dysplasia is present.  Trichrome stain reveals several portal   tracts with expansion by collagenous tissue with focal septi formation.  No   bridging fibrosis is present.  Reticulin stain, DPAS, PAS, and iron stains   are noncontributory or reveal normal expected findings.  Controls are   adequate.  Differential diagnosis includes nonalcoholic steatohepatitis,   alcohol hepatitis, drug toxicity, and hypersensitivity reaction.  Clinical   correlation is recommended..  Using the NAFLD activity score, this biopsy   would received 1+ for lobular inflammation, 2 + for steatosis, and 1+ for   ballooning degeneration for total score of 4/8.  The staging score would be1c   for mild portal fibrosis without pericellular fibrosis      - LFTs started ocming down with immunosuppression  - Peak AST/ALT= 397/631  - normal labs in April 2023    - NSAIDs  - Some RUQ discomfort- intermittent    - no  supplements in 2021  - on premarin cream Feb 2021    AZA- 50 mg daily- stopped UDCA  Liver and kidney cysts on imaging    Abdo US: 4/25/23  Unremarkable exam    PMH:  Appendectomy 2019  Bladder suspension  Kidney stone + kidney cysts  Thyroid nodule    SH:  Alcohol- nil for 25 years  Non smoker    RN - retired 2013      FH:  Nil autoimmune  DM  Heart disease      Review of Systems   Constitutional:  Negative for activity change, appetite change, chills, fatigue, fever and unexpected weight change.   HENT:  Negative for ear pain, hearing loss, nosebleeds, sore throat and trouble swallowing.    Eyes:  Negative for redness and visual disturbance.   Respiratory:  Negative for cough, chest tightness, shortness of breath and wheezing.    Cardiovascular:  Negative for chest pain and palpitations.   Gastrointestinal:  Negative for abdominal distention, abdominal pain, blood in stool, constipation, diarrhea, nausea and vomiting.   Genitourinary:  Negative for difficulty urinating, dysuria, frequency, hematuria and urgency.   Musculoskeletal:  Negative for arthralgias, back pain, gait problem, joint swelling and myalgias.   Skin:  Negative for rash.   Neurological:  Negative for tremors, seizures, speech difficulty, weakness and headaches.   Hematological:  Negative for adenopathy.   Psychiatric/Behavioral:  Negative for confusion, decreased concentration and sleep disturbance. The patient is not nervous/anxious.          Lab Results   Component Value Date    ALT 23 04/12/2023    AST 36 04/12/2023    ALKPHOS 60 04/12/2023    BILITOT 0.6 04/12/2023     Past Medical History:   Diagnosis Date    Auto immune neutropenia     Thyroid nodule      Past Surgical History:   Procedure Laterality Date    APPENDECTOMY      BLADDER SUSPENSION      COLONOSCOPY N/A 04/22/2021    Procedure: COLONOSCOPY;  Surgeon: Bo Upton MD;  Location: [facility];  Service: Endoscopy;  Laterality: N/A;     Current Outpatient Medications   Medication Sig     azaTHIOprine (IMURAN) 50 mg Tab Take 1 tablet (50 mg total) by mouth once daily.    azaTHIOprine (IMURAN) 50 mg Tab Take 50 mg by mouth once daily.    multivit-min-FA-lycopen-lutein 0.4 mg-300 mcg- 250 mcg Tab Take 1 tablet by mouth once daily.     No current facility-administered medications for this visit.       Objective:      Physical Exam  Constitutional:       General: She is not in acute distress.  HENT:      Head: Normocephalic.   Eyes:      Pupils: Pupils are equal, round, and reactive to light.   Neck:      Thyroid: No thyromegaly.      Vascular: No JVD.      Trachea: No tracheal deviation.   Cardiovascular:      Rate and Rhythm: Normal rate and regular rhythm.      Heart sounds: Normal heart sounds. No murmur heard.  Pulmonary:      Effort: Pulmonary effort is normal.      Breath sounds: Normal breath sounds. No stridor.   Abdominal:      Palpations: Abdomen is soft.   Lymphadenopathy:      Head:      Right side of head: No submental, submandibular, tonsillar, preauricular, posterior auricular or occipital adenopathy.      Left side of head: No submental, submandibular, tonsillar, preauricular, posterior auricular or occipital adenopathy.      Cervical: No cervical adenopathy.   Neurological:      Mental Status: She is alert. She is not disoriented.      Cranial Nerves: No cranial nerve deficit.      Sensory: No sensory deficit.           Assessment:       1. Autoimmune hepatitis        Plan:   It appears that she has responded well to AZA- never had corticosteroids.    - remains well and has normal LFTs on AZA 50 mg daily    - labs every 3 months  - clinic in 6 months

## 2023-08-24 LAB — SMOOTH MUSCLE AB TITR SER IF: NORMAL {TITER}

## 2023-09-06 PROBLEM — D84.821 DRUG-INDUCED IMMUNODEFICIENCY: Status: ACTIVE | Noted: 2023-09-06

## 2023-09-06 PROBLEM — Z79.899 DRUG-INDUCED IMMUNODEFICIENCY: Status: ACTIVE | Noted: 2023-09-06

## 2024-02-08 PROBLEM — R31.29 MICROSCOPIC HEMATURIA: Status: ACTIVE | Noted: 2024-02-08

## 2024-02-20 RX ORDER — AZATHIOPRINE 50 MG/1
50 TABLET ORAL
Qty: 30 TABLET | Refills: 11 | Status: SHIPPED | OUTPATIENT
Start: 2024-02-20

## 2024-02-21 ENCOUNTER — OFFICE VISIT (OUTPATIENT)
Dept: HEPATOLOGY | Facility: CLINIC | Age: 64
End: 2024-02-21
Payer: COMMERCIAL

## 2024-02-21 DIAGNOSIS — K75.4 AUTOIMMUNE HEPATITIS: Primary | ICD-10-CM

## 2024-02-21 PROCEDURE — 99215 OFFICE O/P EST HI 40 MIN: CPT | Mod: 95,,, | Performed by: INTERNAL MEDICINE

## 2024-02-21 PROCEDURE — 1159F MED LIST DOCD IN RCRD: CPT | Mod: CPTII,95,, | Performed by: INTERNAL MEDICINE

## 2024-02-21 PROCEDURE — 1160F RVW MEDS BY RX/DR IN RCRD: CPT | Mod: CPTII,95,, | Performed by: INTERNAL MEDICINE

## 2024-02-21 NOTE — PROGRESS NOTES
Subjective:       Patient ID: Dea Martin is a 63 y.o. female.    Chief Complaint: No chief complaint on file.  The patient location is: Home  The chief complaint leading to consultation is: Autoimmune hepatitis    Visit type: audiovisual    Face to Face time with patient: 15 minutes of total time spent on the encounter, which includes face to face time and non-face to face time preparing to see the patient (eg, review of tests), Obtaining and/or reviewing separately obtained history, Documenting clinical information in the electronic or other health record, Independently interpreting results (not separately reported) and communicating results to the patient/family/caregiver, or Care coordination (not separately reported).       Each patient to whom he or she provides medical services by telemedicine is:  (1) informed of the relationship between the physician and patient and the respective role of any other health care provider with respect to management of the patient; and (2) notified that he or she may decline to receive medical services by telemedicine and may withdraw from such care at any time.    Notes:     HPI  I saw this 63 y.o. lady for follow up. I first met her by video visit in Jan 2023 and last saw her in Aug 2023.    She was diagnosed with Autoimmune hepatitis in the summer of 2021 after LFT elevation noted in March 2021.    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  Differential includes nonalcoholic   steatohepatitis, drug toxicity, and hypersensitivity reaction.  Correlate   clinically. Negative for hepatocellular dysplasia.                Note:  There is moderate chronic portal inflammation containing eosinophils   present along with focal interface activity.  There is patchy mild lobular   inflammation present.  Approximately 40% of lobular hepatocytes exhibit mixed   micro and  macrovesicular steatosis.  There is also patchy ballooning   degeneration of hepatocytes present.  No Sweetie bodies are present.  No   hepatocellular dysplasia is present.  Trichrome stain reveals several portal   tracts with expansion by collagenous tissue with focal septi formation.  No   bridging fibrosis is present.  Reticulin stain, DPAS, PAS, and iron stains   are noncontributory or reveal normal expected findings.  Controls are   adequate.  Differential diagnosis includes nonalcoholic steatohepatitis,   alcohol hepatitis, drug toxicity, and hypersensitivity reaction.  Clinical   correlation is recommended..  Using the NAFLD activity score, this biopsy   would received 1+ for lobular inflammation, 2 + for steatosis, and 1+ for   ballooning degeneration for total score of 4/8.  The staging score would be1c   for mild portal fibrosis without pericellular fibrosis      - LFTs started coming down with immunosuppression  - Peak AST/ALT= 397/631  - normal labs in Feb 2024    SANDEEP 1:160  SMA neg  IgG normal    - NSAIDs  - Some RUQ discomfort- intermittent    - no supplements in 2021  - on premarin cream Feb 2021    AZA- 50 mg daily- stopped UDCA  Liver and kidney cysts on imaging    Abdo US: 4/25/23  Unremarkable exam    PMH:  Appendectomy 2019  Bladder suspension  Kidney stone + kidney cysts  Thyroid nodule    SH:  Alcohol- nil for 25 years  Non smoker    RN - retired 2013      FH:  Nil autoimmune  DM  Heart disease      Review of Systems   Constitutional:  Negative for activity change, appetite change, chills, fatigue, fever and unexpected weight change.   HENT:  Negative for ear pain, hearing loss, nosebleeds, sore throat and trouble swallowing.    Eyes:  Negative for redness and visual disturbance.   Respiratory:  Negative for cough, chest tightness, shortness of breath and wheezing.    Cardiovascular:  Negative for chest pain and palpitations.   Gastrointestinal:  Negative for abdominal distention, abdominal  pain, blood in stool, constipation, diarrhea, nausea and vomiting.   Genitourinary:  Negative for difficulty urinating, dysuria, frequency, hematuria and urgency.   Musculoskeletal:  Negative for arthralgias, back pain, gait problem, joint swelling and myalgias.   Skin:  Negative for rash.   Neurological:  Negative for tremors, seizures, speech difficulty, weakness and headaches.   Hematological:  Negative for adenopathy.   Psychiatric/Behavioral:  Negative for confusion, decreased concentration and sleep disturbance. The patient is not nervous/anxious.          Lab Results   Component Value Date    ALT 22 02/19/2024    ALT 22 02/19/2024    AST 30 02/19/2024    AST 30 02/19/2024    ALKPHOS 74 02/19/2024    ALKPHOS 74 02/19/2024    BILITOT 0.5 02/19/2024    BILITOT 0.5 02/19/2024     Past Medical History:   Diagnosis Date    Auto immune neutropenia     Thyroid nodule      Past Surgical History:   Procedure Laterality Date    APPENDECTOMY      BLADDER SUSPENSION      COLONOSCOPY N/A 04/22/2021    Procedure: COLONOSCOPY;  Surgeon: Bo Upton MD;  Location: [facility];  Service: Endoscopy;  Laterality: N/A;     Current Outpatient Medications   Medication Sig    azaTHIOprine (IMURAN) 50 mg Tab TAKE ONE TABLET BY MOUTH EVERY DAY    multivit-min-FA-lycopen-lutein 0.4 mg-300 mcg- 250 mcg Tab Take 1 tablet by mouth once daily.     No current facility-administered medications for this visit.       Objective:      VIDEO VISIT- EXAM NOT DONE      Assessment:       1. Autoimmune hepatitis      Plan:   It appears that she has responded well to AZA- never had corticosteroids.    - remains well and has normal LFTs on AZA 50 mg daily  - currently investigated for hematuria    - labs every 3 months  - clinic in 6 months  - will order liver US- [place]

## 2024-02-23 ENCOUNTER — TELEPHONE (OUTPATIENT)
Dept: HEPATOLOGY | Facility: CLINIC | Age: 64
End: 2024-02-23
Payer: COMMERCIAL

## 2024-02-23 NOTE — TELEPHONE ENCOUNTER
----- Message from Js Hernadez MD sent at 2/21/2024  8:51 AM CST -----  1. Liver US in [place] soon  2. Labs every 3 monhs starting in May and clinic in 6 months

## 2024-02-23 NOTE — TELEPHONE ENCOUNTER
Spoke with patient. Scheduled u/s on 3/15 and labs every 3 months in May. Placed in a ecall for a 6 months follow up. Mailed appt reminder to patient.

## 2024-03-06 PROBLEM — Z12.11 SCREENING FOR MALIGNANT NEOPLASM OF COLON: Status: RESOLVED | Noted: 2021-04-22 | Resolved: 2024-03-06

## 2024-06-19 DIAGNOSIS — K75.4 AUTOIMMUNE HEPATITIS: Primary | ICD-10-CM

## 2024-06-20 ENCOUNTER — TELEPHONE (OUTPATIENT)
Dept: HEPATOLOGY | Facility: CLINIC | Age: 64
End: 2024-06-20
Payer: COMMERCIAL

## 2024-06-20 NOTE — TELEPHONE ENCOUNTER
----- Message from Gladis Baker RN sent at 6/19/2024  5:08 PM CDT -----  Regarding: FW: Liver U/S  Contact: [phone]  Uriel Salas are in.    Thanks Again  ----- Message -----  From: Magno Petersen MA  Sent: 6/19/2024   4:23 PM CDT  To: Gladis Baker RN  Subject: FW: Liver U/S                                    Need order for US please  ----- Message -----  From: Ana Ernst  Sent: 6/19/2024   4:03 PM CDT  To: [facility] Staff  Subject: Liver U/S                                        CONSULT/ADVISORY    Name of Caller:  HILLARY BUENO [[id number]]    Contact Preference:   [phone]    Nature of Call:  Pt is calling to see when her liver U/S will be scheduled.  States it's supposed to be before 08/21/24.  Pt is also requesting a telehealth visit to be scheduled.  Pt will be out of town 08/2-08/08.  Please call.

## 2024-08-22 LAB — PAP RECOMMENDATION EXT: NORMAL

## 2024-08-23 ENCOUNTER — OFFICE VISIT (OUTPATIENT)
Dept: HEPATOLOGY | Facility: CLINIC | Age: 64
End: 2024-08-23
Payer: COMMERCIAL

## 2024-08-23 DIAGNOSIS — K75.4 AUTOIMMUNE HEPATITIS: Primary | ICD-10-CM

## 2024-08-23 NOTE — PROGRESS NOTES
Subjective:       Patient ID: Dea Martin is a 63 y.o. female.    Chief Complaint: No chief complaint on file.  The patient location is: Home  The chief complaint leading to consultation is: Autoimmune hepatitis    Visit type: audiovisual    Face to Face time with patient: 15 minutes of total time spent on the encounter, which includes face to face time and non-face to face time preparing to see the patient (eg, review of tests), Obtaining and/or reviewing separately obtained history, Documenting clinical information in the electronic or other health record, Independently interpreting results (not separately reported) and communicating results to the patient/family/caregiver, or Care coordination (not separately reported).       Each patient to whom he or she provides medical services by telemedicine is:  (1) informed of the relationship between the physician and patient and the respective role of any other health care provider with respect to management of the patient; and (2) notified that he or she may decline to receive medical services by telemedicine and may withdraw from such care at any time.    Notes:     HPI  I saw this 63 y.o. lady for follow up. I first met her by video visit in Jan 2023 and last saw her in Aug 2023.    She was diagnosed with Autoimmune hepatitis in the summer of 2021 after LFT elevation noted in March 2021.    - liver biopsy- Aug 2021  Dr Upton    Biopsy result: 2021  Interface hepatitis and lobular hepatitis with moderate mixed   steatosis and mild portal fibrosis.  Differential includes nonalcoholic   steatohepatitis, drug toxicity, and hypersensitivity reaction.  Correlate   clinically. Negative for hepatocellular dysplasia.                Note:  There is moderate chronic portal inflammation containing eosinophils   present along with focal interface activity.  There is patchy mild lobular   inflammation present.  Approximately 40% of lobular hepatocytes exhibit mixed    micro and macrovesicular steatosis.  There is also patchy ballooning   degeneration of hepatocytes present.  No Sweetie bodies are present.  No   hepatocellular dysplasia is present.  Trichrome stain reveals several portal   tracts with expansion by collagenous tissue with focal septi formation.  No   bridging fibrosis is present.  Reticulin stain, DPAS, PAS, and iron stains   are noncontributory or reveal normal expected findings.  Controls are   adequate.  Differential diagnosis includes nonalcoholic steatohepatitis,   alcohol hepatitis, drug toxicity, and hypersensitivity reaction.  Clinical   correlation is recommended..  Using the NAFLD activity score, this biopsy   would received 1+ for lobular inflammation, 2 + for steatosis, and 1+ for   ballooning degeneration for total score of 4/8.  The staging score would be1c   for mild portal fibrosis without pericellular fibrosis      - LFTs started coming down with immunosuppression  - Peak AST/ALT= 397/631  - normal labs in Feb 2024    SANDEEP 1:160  SMA neg  IgG normal    - NSAIDs  - Some RUQ discomfort- intermittent    - no supplements in 2021  - on premarin cream Feb 2021    AZA- 50 mg daily- stopped UDCA  Liver and kidney cysts on imaging    Abdo US: 8/22/24  Normal right upper quadrant ultrasound     PMH:  Appendectomy 2019  Bladder suspension  Kidney stone + kidney cysts  Thyroid nodule    SH:  Alcohol- nil for 25 years  Non smoker    RN - retired 2013      FH:  Nil autoimmune  DM  Heart disease      Review of Systems   Constitutional:  Negative for activity change, appetite change, chills, fatigue, fever and unexpected weight change.   HENT:  Negative for ear pain, hearing loss, nosebleeds, sore throat and trouble swallowing.    Eyes:  Negative for redness and visual disturbance.   Respiratory:  Negative for cough, chest tightness, shortness of breath and wheezing.    Cardiovascular:  Negative for chest pain and palpitations.   Gastrointestinal:  Negative for  abdominal distention, abdominal pain, blood in stool, constipation, diarrhea, nausea and vomiting.   Genitourinary:  Negative for difficulty urinating, dysuria, frequency, hematuria and urgency.   Musculoskeletal:  Negative for arthralgias, back pain, gait problem, joint swelling and myalgias.   Skin:  Negative for rash.   Neurological:  Negative for tremors, seizures, speech difficulty, weakness and headaches.   Hematological:  Negative for adenopathy.   Psychiatric/Behavioral:  Negative for confusion, decreased concentration and sleep disturbance. The patient is not nervous/anxious.          Lab Results   Component Value Date    ALT 16 08/22/2024    AST 24 08/22/2024    ALKPHOS 69 08/22/2024    BILITOT 0.6 08/22/2024     Past Medical History:   Diagnosis Date    Auto immune neutropenia     Thyroid nodule      Past Surgical History:   Procedure Laterality Date    APPENDECTOMY      BLADDER SUSPENSION      COLONOSCOPY N/A 04/22/2021    Procedure: COLONOSCOPY;  Surgeon: Bo Upton MD;  Location: [facility];  Service: Endoscopy;  Laterality: N/A;    CYSTOSCOPY N/A 3/1/2024    Procedure: CYSTOSCOPY;  Surgeon: Mona Lane MD;  Location: [address];  Service: Urology;  Laterality: N/A;  ZOYA prior     Current Outpatient Medications   Medication Sig    azaTHIOprine (IMURAN) 50 mg Tab TAKE ONE TABLET BY MOUTH EVERY DAY    multivit-min-FA-lycopen-lutein 0.4 mg-300 mcg- 250 mcg Tab Take 1 tablet by mouth once daily.     No current facility-administered medications for this visit.       Objective:      VIDEO VISIT- EXAM NOT DONE      Assessment:       1. Autoimmune hepatitis      Plan:   It appears that she has responded well to AZA- never had corticosteroids.    - remains well and has normal LFTs on AZA 50 mg daily    - labs every 3 months  - clinic in 6 months

## 2024-09-03 PROBLEM — N20.1 LEFT URETERAL STONE: Status: ACTIVE | Noted: 2024-09-03

## 2024-09-11 PROBLEM — Z96.0: Status: ACTIVE | Noted: 2024-09-11

## 2024-10-21 ENCOUNTER — PATIENT MESSAGE (OUTPATIENT)
Dept: ADMINISTRATIVE | Facility: HOSPITAL | Age: 64
End: 2024-10-21
Payer: COMMERCIAL

## 2024-10-22 ENCOUNTER — PATIENT MESSAGE (OUTPATIENT)
Dept: RESEARCH | Facility: HOSPITAL | Age: 64
End: 2024-10-22
Payer: COMMERCIAL

## 2024-11-19 PROBLEM — N20.0 CALCULUS OF KIDNEY: Status: ACTIVE | Noted: 2024-11-19

## 2025-01-20 ENCOUNTER — PATIENT MESSAGE (OUTPATIENT)
Dept: ADMINISTRATIVE | Facility: HOSPITAL | Age: 65
End: 2025-01-20
Payer: COMMERCIAL

## 2025-02-06 ENCOUNTER — TELEPHONE (OUTPATIENT)
Dept: HEPATOLOGY | Facility: CLINIC | Age: 65
End: 2025-02-06
Payer: COMMERCIAL

## 2025-02-06 NOTE — TELEPHONE ENCOUNTER
Call returned to the patient.  Scheduled labs and follow up for March 2025.  Patient confirmed and agreed with the appt.  Reminder letter mailed.

## 2025-02-06 NOTE — TELEPHONE ENCOUNTER
----- Message from Roger sent at 2/6/2025 10:35 AM CST -----  Regarding: Consult/Advisory  Contact: [phone]  Consult/Advisory     Name Of Caller: pt         Contact Preference:   [phone]     Nature of call: Pt would like to know if labs are needed for late feb early march since there isn't any scheduled. Please call to advise thank you

## 2025-02-12 ENCOUNTER — PATIENT OUTREACH (OUTPATIENT)
Dept: ADMINISTRATIVE | Facility: HOSPITAL | Age: 65
End: 2025-02-12
Payer: COMMERCIAL

## 2025-02-17 RX ORDER — AZATHIOPRINE 50 MG/1
50 TABLET ORAL
Qty: 30 TABLET | Refills: 6 | Status: SHIPPED | OUTPATIENT
Start: 2025-02-17

## 2025-03-24 ENCOUNTER — TELEPHONE (OUTPATIENT)
Dept: HEPATOLOGY | Facility: CLINIC | Age: 65
End: 2025-03-24

## 2025-03-24 ENCOUNTER — OFFICE VISIT (OUTPATIENT)
Dept: HEPATOLOGY | Facility: CLINIC | Age: 65
End: 2025-03-24
Payer: COMMERCIAL

## 2025-03-24 DIAGNOSIS — K75.4 AUTOIMMUNE HEPATITIS: Primary | ICD-10-CM

## 2025-03-24 PROCEDURE — 1159F MED LIST DOCD IN RCRD: CPT | Mod: CPTII,95,, | Performed by: INTERNAL MEDICINE

## 2025-03-24 PROCEDURE — 1160F RVW MEDS BY RX/DR IN RCRD: CPT | Mod: CPTII,95,, | Performed by: INTERNAL MEDICINE

## 2025-03-24 PROCEDURE — G2211 COMPLEX E/M VISIT ADD ON: HCPCS | Mod: 95,,, | Performed by: INTERNAL MEDICINE

## 2025-03-24 PROCEDURE — 98006 SYNCH AUDIO-VIDEO EST MOD 30: CPT | Mod: 95,,, | Performed by: INTERNAL MEDICINE

## 2025-03-24 NOTE — TELEPHONE ENCOUNTER
----- Message from Js Hernadez MD sent at 3/24/2025  2:15 PM CDT -----  Labs every 3 months and US in Aug 2025- Clinic in Sep 2025

## 2025-03-24 NOTE — PROGRESS NOTES
Subjective:       Patient ID: Dea Martin is a 64 y.o. female.    Chief Complaint: No chief complaint on file.  The patient location is: Home  The chief complaint leading to consultation is: Autoimmune hepatitis    Visit type: audiovisual    Face to Face time with patient: 15 minutes of total time spent on the encounter, which includes face to face time and non-face to face time preparing to see the patient (eg, review of tests), Obtaining and/or reviewing separately obtained history, Documenting clinical information in the electronic or other health record, Independently interpreting results (not separately reported) and communicating results to the patient/family/caregiver, or Care coordination (not separately reported).       Each patient to whom he or she provides medical services by telemedicine is:  (1) informed of the relationship between the physician and patient and the respective role of any other health care provider with respect to management of the patient; and (2) notified that he or she may decline to receive medical services by telemedicine and may withdraw from such care at any time.    Notes:     HPI  I saw this 64 y.o. lady for follow up. I first met her by video visit in Jan 2023 and last saw her in Aug 2024.    She was diagnosed with Autoimmune hepatitis in the summer of 2021 after LFT elevation noted in March 2021.    - liver biopsy- Aug 2021  Dr Upton    Biopsy result: 2021  Interface hepatitis and lobular hepatitis with moderate mixed   steatosis and mild portal fibrosis.  Differential includes nonalcoholic   steatohepatitis, drug toxicity, and hypersensitivity reaction.  Correlate   clinically. Negative for hepatocellular dysplasia.                Note:  There is moderate chronic portal inflammation containing eosinophils   present along with focal interface activity.  There is patchy mild lobular   inflammation present.  Approximately 40% of lobular hepatocytes exhibit mixed    micro and macrovesicular steatosis.  There is also patchy ballooning   degeneration of hepatocytes present.  No Sweetie bodies are present.  No   hepatocellular dysplasia is present.  Trichrome stain reveals several portal   tracts with expansion by collagenous tissue with focal septi formation.  No   bridging fibrosis is present.  Reticulin stain, DPAS, PAS, and iron stains   are noncontributory or reveal normal expected findings.  Controls are   adequate.  Differential diagnosis includes nonalcoholic steatohepatitis,   alcohol hepatitis, drug toxicity, and hypersensitivity reaction.  Clinical   correlation is recommended..  Using the NAFLD activity score, this biopsy   would received 1+ for lobular inflammation, 2 + for steatosis, and 1+ for   ballooning degeneration for total score of 4/8.  The staging score would be1c   for mild portal fibrosis without pericellular fibrosis      - LFTs started coming down with immunosuppression  - Peak AST/ALT= 397/631  - normal labs in Feb 2024    SANDEEP 1:160  SMA neg  IgG normal    - NSAIDs  - Some RUQ discomfort- intermittent    - no supplements in 2021  - on premarin cream Feb 2021    AZA- 50 mg daily- stopped UDCA  Liver and kidney cysts on imaging    Abdo US: 8/22/24  Normal right upper quadrant ultrasound     PMH:  Appendectomy 2019  Bladder suspension  Kidney stone + kidney cysts  Thyroid nodule    SH:  Alcohol- nil for 25 years  Non smoker    RN - retired 2013      FH:  Nil autoimmune  DM  Heart disease      Review of Systems   Constitutional:  Negative for activity change, appetite change, chills, fatigue, fever and unexpected weight change.   HENT:  Negative for ear pain, hearing loss, nosebleeds, sore throat and trouble swallowing.    Eyes:  Negative for redness and visual disturbance.   Respiratory:  Negative for cough, chest tightness, shortness of breath and wheezing.    Cardiovascular:  Negative for chest pain and palpitations.   Gastrointestinal:  Negative for  abdominal distention, abdominal pain, blood in stool, constipation, diarrhea, nausea and vomiting.   Genitourinary:  Negative for difficulty urinating, dysuria, frequency, hematuria and urgency.   Musculoskeletal:  Negative for arthralgias, back pain, gait problem, joint swelling and myalgias.   Skin:  Negative for rash.   Neurological:  Negative for tremors, seizures, speech difficulty, weakness and headaches.   Hematological:  Negative for adenopathy.   Psychiatric/Behavioral:  Negative for confusion, decreased concentration and sleep disturbance. The patient is not nervous/anxious.          Lab Results   Component Value Date    ALT 17 03/06/2025    AST 34 03/06/2025    ALKPHOS 65 03/06/2025    BILITOT 0.8 03/06/2025     Past Medical History:   Diagnosis Date    Hepatitis A immune     Thyroid nodule     has routine ultrasounds to monitor     Past Surgical History:   Procedure Laterality Date    ANKLE SURGERY      broken ankle as a child    APPENDECTOMY N/A 2018    BLADDER SUSPENSION N/A 2012    COLONOSCOPY N/A 04/22/2021    Procedure: COLONOSCOPY;  Surgeon: Bo Upton MD;  Location: [facility];  Service: Endoscopy;  Laterality: N/A;    CYSTOSCOPY N/A 03/01/2024    Procedure: CYSTOSCOPY;  Surgeon: Mona Lane MD;  Location: [address];  Service: Urology;  Laterality: N/A;  ZOYA prior    CYSTOSCOPY W/ URETERAL STENT REMOVAL Left 9/11/2024    Procedure: CYSTOSCOPY, WITH URETERAL STENT REMOVAL;  Surgeon: Mona Lane MD;  Location: [address] [facility];  Service: Urology;  Laterality: Left;    CYSTOURETEROSCOPY, WITH HOLMIUM LASER LITHOTRIPSY OF URETERAL CALCULUS AND STENT INSERTION Left 9/3/2024    Procedure: CYSTOURETEROSCOPY, WITH HOLMIUM LASER LITHOTRIPSY OF URETERAL CALCULUS AND STENT INSERTION;  Surgeon: Mona Lane MD;  [facility];  Service: Urology;  Laterality: Left;  left proximal stone    RETROGRADE PYELOGRAPHY Left 9/3/2024    Procedure: PYELOGRAM, RETROGRADE;  Surgeon: Mona Lane  MD FIONA;  Location: [facility];  Service: Urology;  Laterality: Left;     Current Outpatient Medications   Medication Sig    azaTHIOprine (IMURAN) 50 mg Tab TAKE ONE TABLET BY MOUTH EVERY DAY    hyoscyamine 0.125 mg Subl Place 1 tablet (0.125 mg total) under the tongue every 4 (four) hours as needed (spasm). (Patient not taking: Reported on 12/1/2024)    multivitamin (THERAGRAN) per tablet Take 1 tablet by mouth once daily.     No current facility-administered medications for this visit.       Objective:      VIDEO VISIT- EXAM NOT DONE      Assessment:       1. Autoimmune hepatitis      Plan:   It appears that she has responded well to AZA- never had corticosteroids.    - remains well and has normal LFTs on AZA 50 mg daily    - labs every 3 months  - liver US in the summer of 2025.  - clinic in 6 months    I spent a total of 30 minutes on the day of the visit.  This includes face to face time and non-face to face time preparing to see the patient (eg, review of tests), obtaining and/or reviewing separately obtained history, documenting clinical information in the electronic or other health record, independently interpreting results and communicating results to the patient/family/caregiver, or care coordinator.